# Patient Record
Sex: MALE | Race: BLACK OR AFRICAN AMERICAN | NOT HISPANIC OR LATINO
[De-identification: names, ages, dates, MRNs, and addresses within clinical notes are randomized per-mention and may not be internally consistent; named-entity substitution may affect disease eponyms.]

---

## 2019-04-26 PROBLEM — Z00.00 ENCOUNTER FOR PREVENTIVE HEALTH EXAMINATION: Status: ACTIVE | Noted: 2019-04-26

## 2019-05-08 ENCOUNTER — APPOINTMENT (OUTPATIENT)
Dept: GASTROENTEROLOGY | Facility: CLINIC | Age: 79
End: 2019-05-08
Payer: MEDICARE

## 2019-05-08 VITALS
DIASTOLIC BLOOD PRESSURE: 64 MMHG | HEIGHT: 69 IN | HEART RATE: 95 BPM | WEIGHT: 185 LBS | RESPIRATION RATE: 15 BRPM | BODY MASS INDEX: 27.4 KG/M2 | OXYGEN SATURATION: 98 % | TEMPERATURE: 97.7 F | SYSTOLIC BLOOD PRESSURE: 132 MMHG

## 2019-05-08 DIAGNOSIS — Z86.79 PERSONAL HISTORY OF OTHER DISEASES OF THE CIRCULATORY SYSTEM: ICD-10-CM

## 2019-05-08 DIAGNOSIS — Z86.39 PERSONAL HISTORY OF OTHER ENDOCRINE, NUTRITIONAL AND METABOLIC DISEASE: ICD-10-CM

## 2019-05-08 PROCEDURE — 99203 OFFICE O/P NEW LOW 30 MIN: CPT

## 2019-05-08 RX ORDER — METFORMIN HYDROCHLORIDE 625 MG/1
TABLET ORAL
Refills: 0 | Status: ACTIVE | COMMUNITY

## 2019-05-08 RX ORDER — SIMVASTATIN 80 MG/1
TABLET, FILM COATED ORAL
Refills: 0 | Status: ACTIVE | COMMUNITY

## 2019-05-08 RX ORDER — OLMESARTAN MEDOXOMIL / AMLODIPINE BESYLATE / HYDROCHLOROTHIAZIDE 40; 10; 25 MG/1; MG/1; MG/1
TABLET, FILM COATED ORAL
Refills: 0 | Status: ACTIVE | COMMUNITY

## 2019-05-08 RX ORDER — AMLODIPINE BESYLATE 5 MG/1
TABLET ORAL
Refills: 0 | Status: ACTIVE | COMMUNITY

## 2019-05-08 NOTE — HISTORY OF PRESENT ILLNESS
[FreeTextEntry1] : This is a pleasant 79-year-old man with history of diabetes, hyperlipidemia and hypertension referred by Dr. Case Mills for consultation for small bowel capsule endoscopy. The patient has had chronic anemia for over one year duration. he has required several blood transfusions, last one approximately 2 months ago.He underwent a colonoscopy 2016 and was found to have diverticulosis and several tubular adenomas. Upper endoscopy with gastritis. 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 The patient continues with anemia. He denies abdominal pain, nausea or vomiting. He denies changes in bowel habits. He denies rectal bleeding or melena. He denies dysphagia. He denies prior history of small bowel obstruction or small bowel resection. He has no AICD.

## 2019-05-08 NOTE — ASSESSMENT
[FreeTextEntry1] : This is a 79-year-old man with history of chronic iron deficiency anemia requiring blood transfusion, history of small bowel AVMs, now presenting for small bowel capsule endoscopy for workup of persistent anemia. I explained to him the risks, alternatives and benefits to small bowel capsule. Risk including but not limited to capsule Retention Which May Require Surgical Intervention, Avoidance of MRI, and Incomplete Small Bowel Capsule  Due to Variations in Intestinal Motility. a Leaflets from [facility] on Small Bowel Capsule, a copy of the consent form, and instructions on how to prepare for capsule were given to the patient to take him and review. He is instructed to call me if he has questions or concerns.

## 2021-04-29 ENCOUNTER — NON-APPOINTMENT (OUTPATIENT)
Age: 81
End: 2021-04-29

## 2021-04-29 DIAGNOSIS — D50.9 IRON DEFICIENCY ANEMIA, UNSPECIFIED: ICD-10-CM

## 2021-04-29 RX ORDER — METOCLOPRAMIDE 10 MG/1
10 TABLET ORAL
Qty: 1 | Refills: 0 | Status: ACTIVE | COMMUNITY
Start: 2021-04-29 | End: 1900-01-01

## 2021-04-29 NOTE — HISTORY OF PRESENT ILLNESS
[de-identified] : Dr. Mills sends patient for small bowel capsule endoscopy\par \par Low fe\par \par No Source on EGD/colonoscopy\par \par No dysphagia\par \par No PPM/defibrillator\par \par No ho/ abd surgery\par \par \par \par

## 2021-04-29 NOTE — REASON FOR VISIT
[Home] : at home, [unfilled] , at the time of the visit. [Medical Office: ([facility])___] : at the medical office located in  [Verbal consent obtained from patient] : the patient, [unfilled] [FreeTextEntry1] : Fe def anemia,sent for capsule

## 2021-04-29 NOTE — ASSESSMENT
[FreeTextEntry1] : Impression,\par \par Fe def\par \par No source on EGD/colonoscopy\par \par Suggest,\par \par Agree with SB capsule endoscopy\par \par R/B/A reviweed

## 2021-06-15 ENCOUNTER — NON-APPOINTMENT (OUTPATIENT)
Age: 81
End: 2021-06-15

## 2021-07-14 ENCOUNTER — APPOINTMENT (OUTPATIENT)
Dept: GASTROENTEROLOGY | Facility: CLINIC | Age: 81
End: 2021-07-14

## 2021-08-03 ENCOUNTER — NON-APPOINTMENT (OUTPATIENT)
Age: 81
End: 2021-08-03